# Patient Record
Sex: FEMALE | Race: WHITE | Employment: UNEMPLOYED | URBAN - METROPOLITAN AREA
[De-identification: names, ages, dates, MRNs, and addresses within clinical notes are randomized per-mention and may not be internally consistent; named-entity substitution may affect disease eponyms.]

---

## 2021-03-03 ENCOUNTER — VIRTUAL VISIT (OUTPATIENT)
Dept: PRIMARY CARE CLINIC | Age: 21
End: 2021-03-03
Payer: COMMERCIAL

## 2021-03-03 ENCOUNTER — TELEPHONE (OUTPATIENT)
Dept: PRIMARY CARE CLINIC | Age: 21
End: 2021-03-03

## 2021-03-03 DIAGNOSIS — J02.9 SORE THROAT: Primary | ICD-10-CM

## 2021-03-03 DIAGNOSIS — J02.9 SORE THROAT: ICD-10-CM

## 2021-03-03 PROCEDURE — 99442 PR PHYS/QHP TELEPHONE EVALUATION 11-20 MIN: CPT | Performed by: NURSE PRACTITIONER

## 2021-03-03 PROCEDURE — 87880 STREP A ASSAY W/OPTIC: CPT | Performed by: NURSE PRACTITIONER

## 2021-03-03 RX ORDER — AMOXICILLIN 875 MG/1
875 TABLET, COATED ORAL 2 TIMES DAILY
Qty: 14 TABLET | Refills: 0 | Status: SHIPPED | OUTPATIENT
Start: 2021-03-03 | End: 2021-03-10

## 2021-03-03 ASSESSMENT — ENCOUNTER SYMPTOMS
SHORTNESS OF BREATH: 0
COUGH: 0
DIARRHEA: 0
VOMITING: 0
SINUS PAIN: 0
ABDOMINAL DISTENTION: 0
SWOLLEN GLANDS: 0
ABDOMINAL PAIN: 0
WHEEZING: 0
TROUBLE SWALLOWING: 0
SORE THROAT: 1
CONSTIPATION: 0
NAUSEA: 0
APNEA: 0
EYE REDNESS: 0
EYE ITCHING: 0

## 2021-03-03 ASSESSMENT — PATIENT HEALTH QUESTIONNAIRE - PHQ9
2. FEELING DOWN, DEPRESSED OR HOPELESS: 0
1. LITTLE INTEREST OR PLEASURE IN DOING THINGS: 0
SUM OF ALL RESPONSES TO PHQ QUESTIONS 1-9: 0
SUM OF ALL RESPONSES TO PHQ QUESTIONS 1-9: 0

## 2021-03-03 NOTE — LETTER
[facility]  [address] [address]  Phone: [phone]  Fax: [phone]    JOSE Joseph CNP        March 3, 2021     Patient: Chelsea Campbell   YOB: 2000   Date of Visit: 3/3/2021       To Whom it May Concern:    Chelsea Campbell was seen in my clinic on 3/3/2021. She may return on 03/05/2021. If you have any questions or concerns, please don't hesitate to call.     Sincerely,         JOSE Joseph CNP

## 2021-03-03 NOTE — PATIENT INSTRUCTIONS
· Do not smoke or allow others to smoke around you. If you need help quitting, talk to your doctor about stop-smoking programs and medicines. These can increase your chances of quitting for good. · Use a vaporizer or humidifier to add moisture to your bedroom. Follow the directions for cleaning the machine. When should you call for help? Call your doctor now or seek immediate medical care if:    · You have new or worse trouble swallowing.     · Your sore throat gets much worse on one side. Watch closely for changes in your health, and be sure to contact your doctor if you do not get better as expected. Where can you learn more? Go to [email or web address] and sign in to your [employer] account. Enter P318 in the [employer] box to learn more about \"Sore Throat: Care Instructions. \"     If you do not have an account, please click on the \"Sign Up Now\" link. Current as of: April 15, 2020               Content Version: 12.6  © 2006-2020 [employer]. Care instructions adapted under license by [facility] ([facility]). If you have questions about a medical condition or this instruction, always ask your healthcare professional. [place] [address] any warranty or liability for your use of this information. Discussed signs and symptoms which require immediate follow-up in ED/call to 911. Patient verbalized understanding. Antibiotic Instructions: Complete the full course of antibiotics as ordered. Take each dose with a small snack or meal to lessen potential GI upset. To prevent antibiotic resistance, please take medication as ordered and for the full duration even if you start to feel better. Consider intake of yogurt or probiotic during antibiotic use and for a few days after to help reduce the risk of developing a secondary infection. Separate the yogurt and antibiotic by at least 1 hour. Avoid alcohol while taking antibiotics.

## 2021-03-03 NOTE — LETTER
[facility]  [address] [address]  Phone: [phone]  Fax: [phone]    JOSE Camacho CNP        March 3, 2021     Patient: Lashanda Median   YOB: 2000   Date of Visit: 3/3/2021       To Whom it May Concern:    Lashanda Medina was seen in my clinic on 3/3/2021. She may return back on 03/04/2021    If you have any questions or concerns, please don't hesitate to call.     Sincerely,         JOSE Camacho CNP

## 2021-03-03 NOTE — PROGRESS NOTES
3/3/2021    Pt and provider completed a telephone visit today. Pt was in her car and provider was in her office. TELEHEALTH EVALUATION -- Audio/Visual (During JI- public health emergency)    HPI:  Pharyngitis  This is a new problem. The current episode started yesterday. The problem occurs constantly. The problem has been unchanged. Associated symptoms include a sore throat. Pertinent negatives include no abdominal pain, arthralgias, chest pain, chills, congestion, coughing, fatigue, fever, headaches, myalgias, nausea, numbness, rash, swollen glands, urinary symptoms or vomiting. Associated symptoms comments: Pt reports she has white spots noted to the back of throat today and \"feels 100% like strep as she has had this in past\", would like tx today  . The symptoms are aggravated by swallowing. She has tried NSAIDs (ibuprofen) for the symptoms. The treatment provided moderate relief. Pt would like strep testing today. 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 HENT: Positive for sore throat. Negative for congestion, drooling, ear pain, hearing loss, sinus pain and trouble swallowing. Pt reports white spots to the back of throat today     Eyes: Negative for redness, itching and visual disturbance. Respiratory: Negative for apnea, cough, shortness of breath and wheezing. Cardiovascular: Negative for chest pain and palpitations. Gastrointestinal: Negative for abdominal distention, abdominal pain, constipation, diarrhea, nausea and vomiting. Endocrine: Negative for heat intolerance. Genitourinary: Negative for difficulty urinating and urgency. Musculoskeletal: Negative for arthralgias, gait problem, myalgias and neck stiffness. Skin: Negative for rash. Neurological: Negative for tremors, seizures, facial asymmetry, numbness and headaches. Psychiatric/Behavioral: Negative for confusion. All other systems reviewed and are negative. Prior to Visit Medications    Medication Sig Taking? Authorizing Provider   amoxicillin (AMOXIL) 875 MG tablet Take 1 tablet by mouth 2 times daily for 7 days Yes JOSE Jackson - CNP       Social History     Tobacco Use    Smoking status: Not on file   Substance Use Topics    Alcohol use: Not on file    Drug use: Not on file        No Known Allergies, No past medical history on file., No past surgical history on file.,   Social History     Tobacco Use    Smoking status: Not on file   Substance Use Topics    Alcohol use: Not on file    Drug use: Not on file   , No family history on file.,   There is no immunization history on file for this patient. PHYSICAL EXAMINATION:  [ INSTRUCTIONS:  \"[x]\" Indicates a positive item  \"[]\" Indicates a negative item  -- DELETE ALL ITEMS NOT EXAMINED]  Vital Signs: (As obtained by patient/caregiver or practitioner observation)    Blood pressure-  Heart rate-    Respiratory rate-    Temperature-  Pulse oximetry-    Pt was unable to provide me with any VS today but denies fevers, chills, denies Covid test.    Mental status  [x] Alert and awake  [x] Oriented to person/place/time [x]Able to follow commands        ASSESSMENT/PLAN:  1. Sore throat    - POCT rapid strep A  - Culture, Throat; Future  - amoxicillin (AMOXIL) 875 MG tablet; Take 1 tablet by mouth 2 times daily for 7 days  Dispense: 14 tablet;  Refill: 0 Kristan Evans is a 19 y.o. female being evaluated by a Virtual Visit (telephone visit) encounter to address concerns as mentioned above. A caregiver was present when appropriate. Due to this being a TeleHealth encounter (During EGCGX-04 public health emergency), evaluation of the following organ systems was limited: Vitals/Constitutional/EENT/Resp/CV/GI//MS/Neuro/Skin/Heme-Lymph-Imm. Pursuant to the emergency declaration under the [address] [place] [address], [address] [place] [address] and the [employer] Act, this Virtual Visit was conducted with patient's (and/or legal guardian's) consent, to reduce the patient's risk of exposure to COVID-19 and provide necessary medical care. The patient (and/or legal guardian) has also been advised to contact this office for worsening conditions or problems, and seek emergency medical treatment and/or call 911 if deemed necessary. Patient identification was verified at the start of the visit: Yes    Total time spent on this encounter: 11 min    Services were provided through a video synchronous discussion virtually to substitute for in-person clinic visit. Patient and provider were located at their individual homes. --JOSE Bhagat CNP on 3/3/2021 at 12:38 PM    An electronic signature was used to authenticate this note.

## 2021-03-03 NOTE — TELEPHONE ENCOUNTER
Called pt and advised her that her POCT came back NEG and pt will start the oral ATB today and she is not returning back to work until 3/5. Letter was given to pt today upon her leaving the office.

## 2021-03-06 ENCOUNTER — TELEPHONE (OUTPATIENT)
Dept: PRIMARY CARE CLINIC | Age: 21
End: 2021-03-06

## 2021-03-06 LAB — THROAT CULTURE: NORMAL
